# Patient Record
Sex: FEMALE | Race: WHITE | Employment: OTHER | URBAN - METROPOLITAN AREA
[De-identification: names, ages, dates, MRNs, and addresses within clinical notes are randomized per-mention and may not be internally consistent; named-entity substitution may affect disease eponyms.]

---

## 2020-08-19 ENCOUNTER — THERAPY VISIT (OUTPATIENT)
Dept: PHYSICAL THERAPY | Facility: CLINIC | Age: 60
End: 2020-08-19
Payer: COMMERCIAL

## 2020-08-19 DIAGNOSIS — G89.29 CHRONIC LEFT SHOULDER PAIN: ICD-10-CM

## 2020-08-19 DIAGNOSIS — M25.512 CHRONIC LEFT SHOULDER PAIN: ICD-10-CM

## 2020-08-19 PROCEDURE — 97110 THERAPEUTIC EXERCISES: CPT | Mod: GP | Performed by: PHYSICAL THERAPIST

## 2020-08-19 PROCEDURE — 97161 PT EVAL LOW COMPLEX 20 MIN: CPT | Mod: GP | Performed by: PHYSICAL THERAPIST

## 2020-08-19 NOTE — PROGRESS NOTES
[facility] Initial Evaluation  Subjective:  The history is provided by the patient. No  was used.   Therapist Generated HPI Evaluation  Problem details: Started in January and there is no known reason that she can think of that caused this. One day it was just sore and maybe thought she slept on it funky, got back in March and pain would just not go away. .         Type of problem:  Left shoulder.    This is a chronic (7/23/20) condition.  Condition occurred with:  Unknown cause.    Patient reports pain:  Anterior and in the joint.    Pain radiates to:  Shoulder.     Associated symptoms:  Loss of motion/stiffness and loss of strength. Exacerbated by: lifting, reaching back in the car, wearing a bra, carrying purse, reaching  back, reaching overhead, turning the steering wheel.  Relieved by: chiro gave wand exercise.          Patient Health History  Marlen Simon being seen for left shoulder.     Problem began: 7/23/2020.   Problem occurred: unknown   Pain is reported as 4/10 on pain scale.  General health as reported by patient is good.  Pertinent medical history includes: overweight and high blood pressure.   Red flags:  None as reported by patient.  Medical allergies: none.   Surgeries include:  Orthopedic surgery. Other surgery history details: torn meniscus on right knee.    Current medications:  None.    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   Primary job tasks include:  Repetitive tasks.                                    Objective:  System                   Shoulder Evaluation:  ROM:  AROM:    Flexion:  Left:  142 with ERP    Right:  160  Extension: Left: 45Right: 57  Abduction:  Left: 135 ERP   Right:  180      External Rotation:  Left:  50 ERP    Right:  70            Extension/Internal Rotation:  Left:  Gluteal    Right:  T8    PROM:    Flexion:  Left:  140        Extension:  Left:  44      Abduction:  Left:  135                               Strength:    Flexion: Left:4+/5 Weak/painful    Pain:    Right: 5/5  Strong/pain free     Pain:   Extension:  Left: 5/5  Strong/pain free    Pain:    Right: 5/5    Strong/pain free  Pain:  Abduction:  Left: 4/5  Weak/painful  Pain:    Right: 5/5   Strong/pain free    Pain:  Adduction:  Left: 5/5   Strong/pain free   Pain:    Right: 5/5   Strong/pain free    Pain:  Internal Rotation:  Left:4/5   Weak/painful    Pain:    Right: 5/5   Strong/pain free    Pain:  External Rotation:   Left:4/5   Weak/painful    Pain:   Right:/5  Strong/pain free    Pain:    Horizontal Abduction:  Left:5/5   Strong/pain free    Pain:    Right:5/5   Strong/pain free   Pain:  Horizontal Adduction:  Left:5/5   Strong/pain free    Pain:    Right:5/5   Strong/pain free    Pain:  Elbow Flexion:  Left:5/5   Strong/pain free    Pain:    Right:5/5   Strong/pain free    Pain:  Elbow Extension:  Left:5/5   Strong/pain free    Pain:    Right:5/5   Strong/pain free    Pain:    Special Tests:    Left shoulder positive for the following special tests:  Impingement and Bursal  Left shoulder negative for the following special tests:  Rotator cuff tear and Acromioclavicular    Palpation:    Left shoulder tenderness present at:  Supraspinatus and Infraspinatus                                       General     ROS    Assessment/Plan:    Patient is a 60 year old female with left side shoulder complaints.    Patient has the following significant findings with corresponding treatment plan.                Diagnosis 1:  Left shoulder pain / potential adhesive capsulitis  Pain -  hot/cold therapy, US, manual therapy, self management, education, directional preference exercise and home program  Decreased ROM/flexibility - manual therapy, therapeutic exercise, therapeutic activity and home program  Decreased joint mobility - manual therapy, therapeutic exercise, therapeutic activity and home program  Decreased strength - therapeutic  exercise, therapeutic activities and home program  Decreased function - therapeutic activities and home program    Therapy Evaluation Codes:   1) History comprised of:   Personal factors that impact the plan of care:      Time since onset of symptoms.    Comorbidity factors that impact the plan of care are:      Overweight.     Medications impacting care: None.  2) Examination of Body Systems comprised of:   Body structures and functions that impact the plan of care:      Shoulder.   Activity limitations that impact the plan of care are:      Cooking, Driving and Lifting.  3) Clinical presentation characteristics are:   Stable/Uncomplicated.  4) Decision-Making    Low complexity using standardized patient assessment instrument and/or measureable assessment of functional outcome.  Cumulative Therapy Evaluation is: Low complexity.    Previous and current functional limitations:  (See Goal Flow Sheet for this information)    Short term and Long term goals: (See Goal Flow Sheet for this information)     Communication ability:  Patient appears to be able to clearly communicate and understand verbal and written communication and follow directions correctly.  Treatment Explanation - The following has been discussed with the patient:   RX ordered/plan of care  Anticipated outcomes  Possible risks and side effects  This patient would benefit from PT intervention to resume normal activities.   Rehab potential is good.    Frequency:  1 X week, once daily  Duration:  for 6 weeks  Discharge Plan:  Achieve all LTG.  Independent in home treatment program.  Reach maximal therapeutic benefit.    Please refer to the daily flowsheet for treatment today, total treatment time and time spent performing 1:1 timed codes.

## 2020-08-19 NOTE — LETTER
[facility] PHYSICAL THERAPY  [address]  [facility] [address]  [phone]    2020    Re: Marlen Simon   :   1960  MRN:  [id number]   REFERRING PHYSICIAN:   Rhonda Stringer    [facility] PHYSICAL THERAPY  Date of Initial Evaluation: 20  Visits:  Rxs Used: 1  Reason for Referral:  Chronic left shoulder pain    EVALUATION SUMMARY    [place] [facility] Initial Evaluation    Subjective:  The history is provided by the patient. No  was used.     Therapist Generated HPI Evaluation  Problem details: Started in January and there is no known reason that she can think of that caused this. One day it was just sore and maybe thought she slept on it funky, got back in March and pain would just not go away. .         Type of problem:  Left shoulder.    This is a chronic (20) condition.  Condition occurred with:  Unknown cause.    Patient reports pain:  Anterior and in the joint.    Pain radiates to:  Shoulder.     Associated symptoms:  Loss of motion/stiffness and loss of strength. Exacerbated by: lifting, reaching back in the car, wearing a bra, carrying purse, reaching  back, reaching overhead, turning the steering wheel.  Relieved by: chiro gave wand exercise.    Patient Health History  Marlen Simon being seen for left shoulder.     Problem began: 2020.   Problem occurred: unknown   Pain is reported as 4/10 on pain scale.  General health as reported by patient is good.  Pertinent medical history includes: overweight and high blood pressure.   Red flags:  None as reported by patient.  Medical allergies: none.   Surgeries include:  Orthopedic surgery. Other surgery history details: torn meniscus on right knee.    Current medications:  None.    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   Primary job tasks include:  Repetitive tasks.     Objective:       Shoulder  Evaluation:  ROM:  AROM:    Flexion:  Left:  142 with ERP    Right:  160  Extension: Left: 45Right: 57  Abduction:  Left: 135 ERP   Right:  180  External Rotation:  Left:  50 ERP    Right:  70  Extension/Internal Rotation:  Left:  Gluteal    Right:  T8      PROM:    Flexion:  Left:  140        Extension:  Left:  44      Abduction:  Left:  135        Strength:    Flexion: Left:4+/5 Weak/painful    Pain:    Right: 5/5  Strong/pain free     Pain:   Extension:  Left: 5/5  Strong/pain free    Pain:    Right: 5/5    Strong/pain free  Pain:  Abduction:  Left: 4/5  Weak/painful  Pain:    Right: 5/5   Strong/pain free    Pain:  Adduction:  Left: 5/5   Strong/pain free   Pain:    Right: 5/5   Strong/pain free    Pain:  Internal Rotation:  Left:4/5   Weak/painful    Pain:    Right: 5/5   Strong/pain free    Pain:  External Rotation:   Left:4/5   Weak/painful    Pain:   Right:/5  Strong/pain free    Pain:    Horizontal Abduction:  Left:5/5   Strong/pain free    Pain:    Right:5/5   Strong/pain free   Pain:  Horizontal Adduction:  Left:5/5   Strong/pain free    Pain:    Right:5/5   Strong/pain free    Pain:  Elbow Flexion:  Left:5/5   Strong/pain free    Pain:    Right:5/5   Strong/pain free    Pain:  Elbow Extension:  Left:5/5   Strong/pain free    Pain:    Right:5/5   Strong/pain free    Pain:    Special Tests:    Left shoulder positive for the following special tests:  Impingement and Bursal  Left shoulder negative for the following special tests:  Rotator cuff tear and Acromioclavicular    Palpation:    Left shoulder tenderness present at:  Supraspinatus and Infraspinatus    Assessment/Plan:    Patient is a 60 year old female with left side shoulder complaints.    Patient has the following significant findings with corresponding treatment plan.                  Diagnosis 1:  Left shoulder pain / potential adhesive capsulitis  Pain -  hot/cold therapy, US, manual therapy, self management, education, directional preference  exercise and home program    Decreased ROM/flexibility - manual therapy, therapeutic exercise, therapeutic activity and home program  Decreased joint mobility - manual therapy, therapeutic exercise, therapeutic activity and home program  Decreased strength - therapeutic exercise, therapeutic activities and home program  Decreased function - therapeutic activities and home program    Therapy Evaluation Codes:   1) History comprised of:   Personal factors that impact the plan of care:      Time since onset of symptoms.    Comorbidity factors that impact the plan of care are:      Overweight.     Medications impacting care: None.  2) Examination of Body Systems comprised of:   Body structures and functions that impact the plan of care:      Shoulder.   Activity limitations that impact the plan of care are:      Cooking, Driving and Lifting.  3) Clinical presentation characteristics are:   Stable/Uncomplicated.  4) Decision-Making    Low complexity using standardized patient assessment instrument and/or measureable assessment of functional outcome.  Cumulative Therapy Evaluation is: Low complexity.    Previous and current functional limitations:  (See Goal Flow Sheet for this information)    Short term and Long term goals: (See Goal Flow Sheet for this information)     Communication ability:  Patient appears to be able to clearly communicate and understand verbal and written communication and follow directions correctly.    Treatment Explanation - The following has been discussed with the patient:   RX ordered/plan of care  Anticipated outcomes  Possible risks and side effects    This patient would benefit from PT intervention to resume normal activities.   Rehab potential is good.  Frequency:  1 X week, once daily  Duration:  for 6 weeks  Discharge Plan:  Achieve all LTG.  Independent in home treatment program.  Reach maximal therapeutic benefit.    Thank you for your referral.    INQUIRIES  Therapist: Gil Riggins  [facility] PHYSICAL THERAPY  [address]  [facility] [address]  Phone: [phone]  Fax: [phone]

## 2020-08-26 ENCOUNTER — THERAPY VISIT (OUTPATIENT)
Dept: PHYSICAL THERAPY | Facility: CLINIC | Age: 60
End: 2020-08-26
Payer: COMMERCIAL

## 2020-08-26 DIAGNOSIS — M25.512 CHRONIC LEFT SHOULDER PAIN: ICD-10-CM

## 2020-08-26 DIAGNOSIS — G89.29 CHRONIC LEFT SHOULDER PAIN: ICD-10-CM

## 2020-08-26 PROCEDURE — 97140 MANUAL THERAPY 1/> REGIONS: CPT | Mod: GP | Performed by: PHYSICAL THERAPIST

## 2020-08-26 PROCEDURE — 97110 THERAPEUTIC EXERCISES: CPT | Mod: GP | Performed by: PHYSICAL THERAPIST

## 2020-09-02 ENCOUNTER — THERAPY VISIT (OUTPATIENT)
Dept: PHYSICAL THERAPY | Facility: CLINIC | Age: 60
End: 2020-09-02
Payer: COMMERCIAL

## 2020-09-02 DIAGNOSIS — G89.29 CHRONIC LEFT SHOULDER PAIN: ICD-10-CM

## 2020-09-02 DIAGNOSIS — M25.512 CHRONIC LEFT SHOULDER PAIN: ICD-10-CM

## 2020-09-02 PROCEDURE — 97140 MANUAL THERAPY 1/> REGIONS: CPT | Mod: GP | Performed by: PHYSICAL THERAPIST

## 2020-09-02 PROCEDURE — 97110 THERAPEUTIC EXERCISES: CPT | Mod: GP | Performed by: PHYSICAL THERAPIST

## 2020-09-15 ENCOUNTER — THERAPY VISIT (OUTPATIENT)
Dept: PHYSICAL THERAPY | Facility: CLINIC | Age: 60
End: 2020-09-15
Payer: COMMERCIAL

## 2020-09-15 DIAGNOSIS — M25.512 CHRONIC LEFT SHOULDER PAIN: ICD-10-CM

## 2020-09-15 DIAGNOSIS — G89.29 CHRONIC LEFT SHOULDER PAIN: ICD-10-CM

## 2020-09-15 PROCEDURE — 97110 THERAPEUTIC EXERCISES: CPT | Mod: GP | Performed by: PHYSICAL THERAPY ASSISTANT

## 2020-09-15 PROCEDURE — 97140 MANUAL THERAPY 1/> REGIONS: CPT | Mod: GP | Performed by: PHYSICAL THERAPY ASSISTANT

## 2020-09-23 ENCOUNTER — THERAPY VISIT (OUTPATIENT)
Dept: PHYSICAL THERAPY | Facility: CLINIC | Age: 60
End: 2020-09-23
Payer: COMMERCIAL

## 2020-09-23 DIAGNOSIS — M25.512 CHRONIC LEFT SHOULDER PAIN: ICD-10-CM

## 2020-09-23 DIAGNOSIS — G89.29 CHRONIC LEFT SHOULDER PAIN: ICD-10-CM

## 2020-09-23 PROCEDURE — 97140 MANUAL THERAPY 1/> REGIONS: CPT | Mod: GP | Performed by: PHYSICAL THERAPY ASSISTANT

## 2020-09-23 PROCEDURE — 97110 THERAPEUTIC EXERCISES: CPT | Mod: GP | Performed by: PHYSICAL THERAPY ASSISTANT

## 2020-09-30 ENCOUNTER — THERAPY VISIT (OUTPATIENT)
Dept: PHYSICAL THERAPY | Facility: CLINIC | Age: 60
End: 2020-09-30
Payer: COMMERCIAL

## 2020-09-30 DIAGNOSIS — M25.512 CHRONIC LEFT SHOULDER PAIN: ICD-10-CM

## 2020-09-30 DIAGNOSIS — G89.29 CHRONIC LEFT SHOULDER PAIN: ICD-10-CM

## 2020-09-30 PROCEDURE — 97110 THERAPEUTIC EXERCISES: CPT | Mod: GP | Performed by: PHYSICAL THERAPIST

## 2020-09-30 PROCEDURE — 97140 MANUAL THERAPY 1/> REGIONS: CPT | Mod: GP | Performed by: PHYSICAL THERAPIST

## 2020-10-07 ENCOUNTER — THERAPY VISIT (OUTPATIENT)
Dept: PHYSICAL THERAPY | Facility: CLINIC | Age: 60
End: 2020-10-07
Payer: COMMERCIAL

## 2020-10-07 DIAGNOSIS — G89.29 CHRONIC LEFT SHOULDER PAIN: ICD-10-CM

## 2020-10-07 DIAGNOSIS — M25.512 CHRONIC LEFT SHOULDER PAIN: ICD-10-CM

## 2020-10-07 PROCEDURE — 97112 NEUROMUSCULAR REEDUCATION: CPT | Mod: GP | Performed by: PHYSICAL THERAPIST

## 2020-10-07 PROCEDURE — 97110 THERAPEUTIC EXERCISES: CPT | Mod: GP | Performed by: PHYSICAL THERAPIST

## 2020-10-07 PROCEDURE — 97140 MANUAL THERAPY 1/> REGIONS: CPT | Mod: GP | Performed by: PHYSICAL THERAPIST

## 2020-10-14 ENCOUNTER — THERAPY VISIT (OUTPATIENT)
Dept: PHYSICAL THERAPY | Facility: CLINIC | Age: 60
End: 2020-10-14
Payer: COMMERCIAL

## 2020-10-14 DIAGNOSIS — G89.29 CHRONIC LEFT SHOULDER PAIN: ICD-10-CM

## 2020-10-14 DIAGNOSIS — M25.512 CHRONIC LEFT SHOULDER PAIN: ICD-10-CM

## 2020-10-14 PROCEDURE — 97112 NEUROMUSCULAR REEDUCATION: CPT | Mod: GP | Performed by: PHYSICAL THERAPIST

## 2020-10-14 PROCEDURE — 97110 THERAPEUTIC EXERCISES: CPT | Mod: GP | Performed by: PHYSICAL THERAPIST

## 2020-10-14 NOTE — LETTER
[facility] PHYSICAL THERAPY  [address]  [facility] [address]  [phone]    2020    Re: Marlen Simon   :   1960  MRN:  [id number]   REFERRING PHYSICIAN:   Rhonda Stringer    [facility] PHYSICAL THERAPY    Date of Initial Evaluation: 2020  Visits:  Rxs Used: 8  Reason for Referral:  Chronic left shoulder pain    EVALUATION SUMMARY    Assessment/Plan:    DISCHARGE REPORT    Progress reporting period is from 20 to 10/14/20.       SUBJECTIVE  Patient reports no worse just cant say she is better.Definitly less pain overall with reaching for her belt and sleeping is hit or miss with her pain but definitely still limited.      Current Pain level: 3/10.     Initial Pain level: 4/10.   Changes in function:  Yes (See Goal flowsheet attached for changes in current functional level)  Adverse reaction to treatment or activity: None    OBJECTIVE  Changes noted in objective findings:  Yes, but progress has plateaued.   Objective: flex 145, abd 150, ER 55, ext/IR butt compared to T9 on the right. IR 4/5, PROM ER 65 deg, IR 33 deg, flexion 145, abd 145.      ASSESSMENT/PLAN  Updated problem list and treatment plan: Diagnosis 1:  Left shoulder pain / adhesive capsulitis  Pain -  home program  Decreased ROM/flexibility - home program  Decreased joint mobility - home program  Decreased strength - home program  Decreased function - home program  STG/LTGs have been met or progress has been made towards goals:  Yes, but progress has plateaued  Assessment of Progress: The patient's condition has potential to improve.  Self Management Plans:  Patient is independent in a home treatment program.  I have re-evaluated this patient and find that the nature, scope, duration and intensity of the therapy is appropriate for the medical condition of the patient.  Marlen continues to require the following intervention to meet STG and LTG's:  HEP    Re: Marlen TAPIA Alfred   :    1960    Recommendations:  Pt has spent 6 weeks of at home stretches and manual mobilization of GH joint with initial increase in ROM but progress has plateaued. Week 7-8 began strengthening with no change in function but instructed pt this takes time. Plan will be for her to continue with stretches as they have helped a little and continue with strengthening to give it time as her pain is 3/10 only. If pain becomes untolerable or around 11/12 if no significant change pt should seek further medical evaluation. This patient is ready to be discharged from therapy and continue their home treatment program.      Thank you for your referral.    INQUIRIES  Therapist: Gil Riggins DPT  [facility] PHYSICAL THERAPY  [address] [facility] [address]  Phone: [phone]  Fax: [phone]

## 2020-10-14 NOTE — PROGRESS NOTES
Subjective:  HPI  Physical Exam                    Objective:  System    Physical Exam    General     ROS    Assessment/Plan:    DISCHARGE REPORT    Progress reporting period is from 8/19/20 to 10/14/20.       SUBJECTIVE  Patient reports no worse just cant say she is better.Definitly less pain overall with reaching for her belt and sleeping is hit or miss with her pain but definitely still limited.      Current Pain level: 3/10.     Initial Pain level: 4/10.   Changes in function:  Yes (See Goal flowsheet attached for changes in current functional level)  Adverse reaction to treatment or activity: None    OBJECTIVE  Changes noted in objective findings:  Yes, but progress has plateaued.   Objective: flex 145, abd 150, ER 55, ext/IR butt compared to T9 on the right. IR 4/5, PROM ER 65 deg, IR 33 deg, flexion 145, abd 145.      ASSESSMENT/PLAN  Updated problem list and treatment plan: Diagnosis 1:  Left shoulder pain / adhesive capsulitis  Pain -  home program  Decreased ROM/flexibility - home program  Decreased joint mobility - home program  Decreased strength - home program  Decreased function - home program  STG/LTGs have been met or progress has been made towards goals:  Yes, but progress has plateaued  Assessment of Progress: The patient's condition has potential to improve.  Self Management Plans:  Patient is independent in a home treatment program.  I have re-evaluated this patient and find that the nature, scope, duration and intensity of the therapy is appropriate for the medical condition of the patient.  Marlen continues to require the following intervention to meet STG and LTG's:  HEP    Recommendations:  Pt has spent 6 weeks of at home stretches and manual mobilization of GH joint with initial increase in ROM but progress has plateaued. Week 7-8 began strengthening with no change in function but instructed pt this takes time. Plan will be for her to continue with stretches as they have helped a little and  continue with strengthening to give it time as her pain is 3/10 only. If pain becomes untolerable or around 11/12 if no significant change pt should seek further medical evaluation. This patient is ready to be discharged from therapy and continue their home treatment program.    Please refer to the daily flowsheet for treatment today, total treatment time and time spent performing 1:1 timed codes.